# Patient Record
Sex: MALE | Race: WHITE | NOT HISPANIC OR LATINO | URBAN - METROPOLITAN AREA
[De-identification: names, ages, dates, MRNs, and addresses within clinical notes are randomized per-mention and may not be internally consistent; named-entity substitution may affect disease eponyms.]

---

## 2023-05-01 ENCOUNTER — CLAIMS CREATED FROM THE CLAIM WINDOW (OUTPATIENT)
Dept: URBAN - METROPOLITAN AREA MEDICAL CENTER 34 | Facility: MEDICAL CENTER | Age: 63
End: 2023-05-01

## 2023-05-01 ENCOUNTER — CLAIMS CREATED FROM THE CLAIM WINDOW (OUTPATIENT)
Dept: URBAN - METROPOLITAN AREA MEDICAL CENTER 34 | Facility: MEDICAL CENTER | Age: 63
End: 2023-05-01
Payer: MEDICAID

## 2023-05-01 DIAGNOSIS — K31.819 ACQUIRED ARTERIOVENOUS MALFORMATION OF DUODENUM: ICD-10-CM

## 2023-05-01 DIAGNOSIS — Z79.1 ENCNTR LONG-TERM NSAID USE: ICD-10-CM

## 2023-05-01 DIAGNOSIS — K92.2 ACUTE GASTROINTESTINAL BLEEDING: ICD-10-CM

## 2023-05-01 DIAGNOSIS — K31.89 OTHER DISEASES OF STOMACH AND DUODENUM: ICD-10-CM

## 2023-05-01 DIAGNOSIS — K92.1 ACUTE MELENA: ICD-10-CM

## 2023-05-01 PROCEDURE — 45378 DIAGNOSTIC COLONOSCOPY: CPT | Performed by: INTERNAL MEDICINE

## 2023-05-01 PROCEDURE — 99254 IP/OBS CNSLTJ NEW/EST MOD 60: CPT | Performed by: INTERNAL MEDICINE

## 2023-05-01 PROCEDURE — 43270 EGD LESION ABLATION: CPT | Performed by: INTERNAL MEDICINE

## 2023-07-07 ENCOUNTER — CLAIMS CREATED FROM THE CLAIM WINDOW (OUTPATIENT)
Dept: URBAN - METROPOLITAN AREA MEDICAL CENTER 34 | Facility: MEDICAL CENTER | Age: 63
End: 2023-07-07
Payer: MEDICAID

## 2023-07-07 DIAGNOSIS — Z79.82 ASPIRIN LONG-TERM USE: ICD-10-CM

## 2023-07-07 DIAGNOSIS — K92.1 ACUTE MELENA: ICD-10-CM

## 2023-07-07 PROCEDURE — 99214 OFFICE O/P EST MOD 30 MIN: CPT | Performed by: PHYSICIAN ASSISTANT

## 2023-07-07 PROCEDURE — 99253 IP/OBS CNSLTJ NEW/EST LOW 45: CPT | Performed by: PHYSICIAN ASSISTANT

## 2023-07-08 ENCOUNTER — CLAIMS CREATED FROM THE CLAIM WINDOW (OUTPATIENT)
Dept: URBAN - METROPOLITAN AREA MEDICAL CENTER 34 | Facility: MEDICAL CENTER | Age: 63
End: 2023-07-08
Payer: MEDICAID

## 2023-07-08 ENCOUNTER — CLAIMS CREATED FROM THE CLAIM WINDOW (OUTPATIENT)
Dept: URBAN - METROPOLITAN AREA MEDICAL CENTER 34 | Facility: MEDICAL CENTER | Age: 63
End: 2023-07-08

## 2023-07-08 DIAGNOSIS — K92.1 ACUTE MELENA: ICD-10-CM

## 2023-07-08 DIAGNOSIS — K29.60 ADENOPAPILLOMATOSIS GASTRICA: ICD-10-CM

## 2023-07-08 PROCEDURE — 44360 SMALL BOWEL ENDOSCOPY: CPT | Performed by: INTERNAL MEDICINE

## 2023-08-30 ENCOUNTER — OFFICE VISIT (OUTPATIENT)
Dept: URBAN - METROPOLITAN AREA CLINIC 94 | Facility: CLINIC | Age: 63
End: 2023-08-30

## 2023-08-30 RX ORDER — LISINOPRIL 20 MG/1
TAKE ONE TABLET BY MOUTH EVERY MORNING TABLET ORAL
Qty: 30 UNSPECIFIED | Refills: 1 | COMMUNITY

## 2023-08-30 RX ORDER — TOPIRAMATE 50 MG/1
TAKE ONE TABLET BY MOUTH EVERY EVENING TABLET, FILM COATED ORAL
Qty: 90 UNSPECIFIED | Refills: 0 | COMMUNITY

## 2023-08-30 RX ORDER — METOPROLOL TARTRATE 25 MG/1
TAKE ONE TABLET BY MOUTH TWICE A DAY TABLET, FILM COATED ORAL
Qty: 180 UNSPECIFIED | Refills: 0 | COMMUNITY

## 2023-08-30 RX ORDER — NORTRIPTYLINE HYDROCHLORIDE 25 MG/1
TAKE ONE CAPSULE BY MOUTH EVERY EVENING FOR HEADACHE CAPSULE ORAL
Qty: 90 UNSPECIFIED | Refills: 0 | COMMUNITY

## 2023-08-30 RX ORDER — NICOTINE 21 MG/24HR
1 PATCH TO SKIN PATCH, TRANSDERMAL 24 HOURS TRANSDERMAL ONCE A DAY
COMMUNITY

## 2023-08-30 RX ORDER — ASPIRIN 81 MG/1
1 TABLET TABLET, CHEWABLE ORAL ONCE A DAY
COMMUNITY

## 2023-08-30 RX ORDER — EZETIMIBE 10 MG/1
TAKE ONE TABLET BY MOUTH EVERY MORNING TABLET ORAL
Qty: 30 UNSPECIFIED | Refills: 1 | COMMUNITY

## 2023-08-30 RX ORDER — FERROUS SULFATE TAB 325 MG (65 MG ELEMENTAL FE) 325 (65 FE) MG
TAKE ONE TABLET BY MOUTH ONE TIME DAILY WITH BREAKFAST TAB ORAL
Qty: 90 UNSPECIFIED | Refills: 1 | COMMUNITY

## 2023-08-30 RX ORDER — PANTOPRAZOLE SODIUM 40 MG/1
TAKE ONE TABLET BY MOUTH ONE TIME DAILY TABLET, DELAYED RELEASE ORAL
Qty: 30 UNSPECIFIED | Refills: 0 | COMMUNITY

## 2023-08-30 RX ORDER — ATORVASTATIN CALCIUM, FILM COATED 40 MG/1
TAKE ONE TABLET BY MOUTH EVERY EVENING TABLET ORAL
Qty: 90 UNSPECIFIED | Refills: 0 | COMMUNITY

## 2023-10-18 ENCOUNTER — OFFICE VISIT (OUTPATIENT)
Dept: URBAN - METROPOLITAN AREA CLINIC 94 | Facility: CLINIC | Age: 63
End: 2023-10-18
Payer: MEDICAID

## 2023-10-18 ENCOUNTER — LAB OUTSIDE AN ENCOUNTER (OUTPATIENT)
Dept: URBAN - METROPOLITAN AREA CLINIC 94 | Facility: CLINIC | Age: 63
End: 2023-10-18

## 2023-10-18 VITALS
TEMPERATURE: 97.3 F | BODY MASS INDEX: 19.49 KG/M2 | SYSTOLIC BLOOD PRESSURE: 153 MMHG | HEART RATE: 73 BPM | DIASTOLIC BLOOD PRESSURE: 92 MMHG | HEIGHT: 65 IN | WEIGHT: 117 LBS

## 2023-10-18 DIAGNOSIS — D50.0 IRON DEFICIENCY ANEMIA DUE TO CHRONIC BLOOD LOSS: ICD-10-CM

## 2023-10-18 PROBLEM — 724556004: Status: ACTIVE | Noted: 2023-10-18

## 2023-10-18 PROCEDURE — 99214 OFFICE O/P EST MOD 30 MIN: CPT | Performed by: INTERNAL MEDICINE

## 2023-10-18 RX ORDER — EZETIMIBE 10 MG/1
TAKE ONE TABLET BY MOUTH EVERY MORNING TABLET ORAL
Qty: 30 UNSPECIFIED | Refills: 1 | Status: ACTIVE | COMMUNITY

## 2023-10-18 RX ORDER — METOPROLOL TARTRATE 25 MG/1
TAKE ONE TABLET BY MOUTH TWICE A DAY TABLET, FILM COATED ORAL
Qty: 180 UNSPECIFIED | Refills: 0 | Status: ACTIVE | COMMUNITY

## 2023-10-18 RX ORDER — LISINOPRIL 20 MG/1
TAKE ONE TABLET BY MOUTH EVERY MORNING TABLET ORAL
Qty: 30 UNSPECIFIED | Refills: 1 | Status: ACTIVE | COMMUNITY

## 2023-10-18 RX ORDER — ASPIRIN 81 MG/1
1 TABLET TABLET, CHEWABLE ORAL ONCE A DAY
Status: ACTIVE | COMMUNITY

## 2023-10-18 RX ORDER — CYCLOBENZAPRINE HYDROCHLORIDE 10 MG/1
1 TABLET AT BEDTIME AS NEEDED TABLET, FILM COATED ORAL ONCE A DAY
Status: ACTIVE | COMMUNITY

## 2023-10-18 RX ORDER — NORTRIPTYLINE HYDROCHLORIDE 25 MG/1
TAKE ONE CAPSULE BY MOUTH EVERY EVENING FOR HEADACHE CAPSULE ORAL
Qty: 90 UNSPECIFIED | Refills: 0 | Status: ACTIVE | COMMUNITY

## 2023-10-18 RX ORDER — ATORVASTATIN CALCIUM, FILM COATED 40 MG/1
TAKE ONE TABLET BY MOUTH EVERY EVENING TABLET ORAL
Qty: 90 UNSPECIFIED | Refills: 0 | Status: ACTIVE | COMMUNITY

## 2023-10-18 RX ORDER — FERROUS SULFATE TAB 325 MG (65 MG ELEMENTAL FE) 325 (65 FE) MG
TAKE ONE TABLET BY MOUTH ONE TIME DAILY WITH BREAKFAST TAB ORAL
Qty: 90 UNSPECIFIED | Refills: 1 | Status: ACTIVE | COMMUNITY

## 2023-10-18 NOTE — HPI-TODAY'S VISIT:
Devan Hebert is a 62 y.o. male  with h/o LIZETT, GIB 2/2 PUD, duodenal bulb stricture s/p dilation, CAD/STEMI (s/p drug eluting stent 5/2022, no longer taking Plavix), migraines (with frequent use of Goody powder), and tobacco dependency who was recently admitted 4/30/23 - 5/2/23 for symptomatic anemia/GIB. EGD/colonoscopy performed during that admission. EGD 5/1/23 (Dr. Ocampo) with small HH, gastric body erythema, widely patent pylorus, small AVM in D2 s/p APC.Colonoscopy 5/2/23 (Dr. Ocampo) left sided diverticulosis and large IH.He presented to ED 7/7/23 c/o melena, for which GI was consulted.  Push enteroscopy 7/8/23; WNL Pt was instrcuted to stop all NSAIDs and smoking -Oupatient pill cam was recommended in 7/2023. - HGB 9.1 on 7/8/23( stable) - Pt states that he had labs by his PCP last month and his HGB was reportedly normal ( no results available). - Pt denies any GI symptoms at this time

## 2023-11-08 ENCOUNTER — OFFICE VISIT (OUTPATIENT)
Dept: URBAN - METROPOLITAN AREA CLINIC 93 | Facility: CLINIC | Age: 63
End: 2023-11-08
Payer: MEDICAID

## 2023-11-08 DIAGNOSIS — D50.9 ANEMIA: ICD-10-CM

## 2023-11-08 PROCEDURE — 91110 GI TRC IMG INTRAL ESOPH-ILE: CPT | Performed by: INTERNAL MEDICINE

## 2023-11-08 RX ORDER — NORTRIPTYLINE HYDROCHLORIDE 25 MG/1
TAKE ONE CAPSULE BY MOUTH EVERY EVENING FOR HEADACHE CAPSULE ORAL
Qty: 90 UNSPECIFIED | Refills: 0 | Status: ACTIVE | COMMUNITY

## 2023-11-08 RX ORDER — FERROUS SULFATE TAB 325 MG (65 MG ELEMENTAL FE) 325 (65 FE) MG
TAKE ONE TABLET BY MOUTH ONE TIME DAILY WITH BREAKFAST TAB ORAL
Qty: 90 UNSPECIFIED | Refills: 1 | Status: ACTIVE | COMMUNITY

## 2023-11-08 RX ORDER — METOPROLOL TARTRATE 25 MG/1
TAKE ONE TABLET BY MOUTH TWICE A DAY TABLET, FILM COATED ORAL
Qty: 180 UNSPECIFIED | Refills: 0 | Status: ACTIVE | COMMUNITY

## 2023-11-08 RX ORDER — LISINOPRIL 20 MG/1
TAKE ONE TABLET BY MOUTH EVERY MORNING TABLET ORAL
Qty: 30 UNSPECIFIED | Refills: 1 | Status: ACTIVE | COMMUNITY

## 2023-11-08 RX ORDER — ASPIRIN 81 MG/1
1 TABLET TABLET, CHEWABLE ORAL ONCE A DAY
Status: ACTIVE | COMMUNITY

## 2023-11-08 RX ORDER — CYCLOBENZAPRINE HYDROCHLORIDE 10 MG/1
1 TABLET AT BEDTIME AS NEEDED TABLET, FILM COATED ORAL ONCE A DAY
Status: ACTIVE | COMMUNITY

## 2023-11-08 RX ORDER — ATORVASTATIN CALCIUM, FILM COATED 40 MG/1
TAKE ONE TABLET BY MOUTH EVERY EVENING TABLET ORAL
Qty: 90 UNSPECIFIED | Refills: 0 | Status: ACTIVE | COMMUNITY

## 2023-11-08 RX ORDER — EZETIMIBE 10 MG/1
TAKE ONE TABLET BY MOUTH EVERY MORNING TABLET ORAL
Qty: 30 UNSPECIFIED | Refills: 1 | Status: ACTIVE | COMMUNITY

## 2023-12-04 ENCOUNTER — OFFICE VISIT (OUTPATIENT)
Dept: URBAN - METROPOLITAN AREA CLINIC 94 | Facility: CLINIC | Age: 63
End: 2023-12-04

## 2024-01-17 ENCOUNTER — OFFICE VISIT (OUTPATIENT)
Dept: URBAN - METROPOLITAN AREA CLINIC 94 | Facility: CLINIC | Age: 64
End: 2024-01-17
Payer: MEDICAID

## 2024-01-17 ENCOUNTER — DASHBOARD ENCOUNTERS (OUTPATIENT)
Age: 64
End: 2024-01-17

## 2024-01-17 VITALS
DIASTOLIC BLOOD PRESSURE: 90 MMHG | WEIGHT: 121 LBS | BODY MASS INDEX: 20.16 KG/M2 | TEMPERATURE: 97.3 F | SYSTOLIC BLOOD PRESSURE: 134 MMHG | HEIGHT: 65 IN | HEART RATE: 96 BPM

## 2024-01-17 DIAGNOSIS — K21.9 CHRONIC GERD: ICD-10-CM

## 2024-01-17 DIAGNOSIS — D50.9 IRON DEFICIENCY ANEMIA, UNSPECIFIED IRON DEFICIENCY ANEMIA TYPE: ICD-10-CM

## 2024-01-17 DIAGNOSIS — K55.20 AVM (ARTERIOVENOUS MALFORMATION) OF SMALL BOWEL, ACQUIRED: ICD-10-CM

## 2024-01-17 PROBLEM — 87522002: Status: ACTIVE | Noted: 2024-01-17

## 2024-01-17 PROBLEM — 235595009: Status: ACTIVE | Noted: 2024-01-17

## 2024-01-17 PROCEDURE — 99214 OFFICE O/P EST MOD 30 MIN: CPT | Performed by: INTERNAL MEDICINE

## 2024-01-17 RX ORDER — NORTRIPTYLINE HYDROCHLORIDE 25 MG/1
TAKE ONE CAPSULE BY MOUTH EVERY EVENING FOR HEADACHE CAPSULE ORAL
Qty: 90 UNSPECIFIED | Refills: 0 | Status: ACTIVE | COMMUNITY

## 2024-01-17 RX ORDER — METOPROLOL TARTRATE 25 MG/1
TAKE ONE TABLET BY MOUTH TWICE A DAY TABLET, FILM COATED ORAL
Qty: 180 UNSPECIFIED | Refills: 0 | Status: ACTIVE | COMMUNITY

## 2024-01-17 RX ORDER — LISINOPRIL 20 MG/1
TAKE ONE TABLET BY MOUTH EVERY MORNING TABLET ORAL
Qty: 30 UNSPECIFIED | Refills: 1 | Status: ACTIVE | COMMUNITY

## 2024-01-17 RX ORDER — ATORVASTATIN CALCIUM, FILM COATED 40 MG/1
TAKE ONE TABLET BY MOUTH EVERY EVENING TABLET ORAL
Qty: 90 UNSPECIFIED | Refills: 0 | Status: ACTIVE | COMMUNITY

## 2024-01-17 RX ORDER — EZETIMIBE 10 MG/1
TAKE ONE TABLET BY MOUTH EVERY MORNING TABLET ORAL
Qty: 30 UNSPECIFIED | Refills: 1 | Status: ACTIVE | COMMUNITY

## 2024-01-17 RX ORDER — CYCLOBENZAPRINE HYDROCHLORIDE 10 MG/1
1 TABLET AT BEDTIME AS NEEDED TABLET, FILM COATED ORAL ONCE A DAY
Status: ACTIVE | COMMUNITY

## 2024-01-17 RX ORDER — FERROUS SULFATE TAB 325 MG (65 MG ELEMENTAL FE) 325 (65 FE) MG
TAKE ONE TABLET BY MOUTH ONE TIME DAILY WITH BREAKFAST TAB ORAL
Qty: 90 UNSPECIFIED | Refills: 1 | Status: ACTIVE | COMMUNITY

## 2024-01-17 RX ORDER — ASPIRIN 81 MG/1
1 TABLET TABLET, CHEWABLE ORAL ONCE A DAY
Status: ACTIVE | COMMUNITY

## 2024-01-17 NOTE — HPI-TODAY'S VISIT:
Devan Hebert is a 62 y.o. male  with h/o LIZETT, GIB 2/2 PUD, duodenal bulb stricture s/p dilation, CAD/STEMI (s/p drug eluting stent 5/2022, no longer taking Plavix), migraines (with frequent use of Goody powder), and tobacco dependency who was admitted 4/30/23 - 5/2/23 for symptomatic anemia/GIB. EGD/colonoscopy performed during that admission. EGD 5/1/23 (Dr. Ocampo) with small HH, gastric body erythema, widely patent pylorus, small AVM in D2 s/p APC.Colonoscopy 5/2/23 (Dr. Ocampo) left sided diverticulosis and large IH.He presented to ED 7/7/23 c/o melena, for which GI was consulted.  Push enteroscopy 7/8/23; WNL Pt was instrcuted to stop all NSAIDs and smoking  -PILL CAM 11/2023: Multiple non-bleeding AVMs throughout small bowel.  - Pt denies any GI symptoms at this time